# Patient Record
Sex: MALE | Race: WHITE | NOT HISPANIC OR LATINO | URBAN - METROPOLITAN AREA
[De-identification: names, ages, dates, MRNs, and addresses within clinical notes are randomized per-mention and may not be internally consistent; named-entity substitution may affect disease eponyms.]

---

## 2021-07-24 ENCOUNTER — INPATIENT (INPATIENT)
Facility: HOSPITAL | Age: 46
LOS: 1 days | Discharge: HOME | DRG: 177 | End: 2021-07-26
Attending: STUDENT IN AN ORGANIZED HEALTH CARE EDUCATION/TRAINING PROGRAM | Admitting: HOSPITALIST
Payer: COMMERCIAL

## 2021-07-24 VITALS
TEMPERATURE: 99 F | HEIGHT: 70 IN | HEART RATE: 98 BPM | RESPIRATION RATE: 18 BRPM | DIASTOLIC BLOOD PRESSURE: 91 MMHG | SYSTOLIC BLOOD PRESSURE: 137 MMHG | WEIGHT: 293.21 LBS | OXYGEN SATURATION: 92 %

## 2021-07-24 DIAGNOSIS — B34.2 CORONAVIRUS INFECTION, UNSPECIFIED: ICD-10-CM

## 2021-07-24 DIAGNOSIS — Z98.890 OTHER SPECIFIED POSTPROCEDURAL STATES: Chronic | ICD-10-CM

## 2021-07-24 LAB
ALBUMIN SERPL ELPH-MCNC: 3.9 G/DL — SIGNIFICANT CHANGE UP (ref 3.3–5.2)
ALP SERPL-CCNC: 91 U/L — SIGNIFICANT CHANGE UP (ref 40–120)
ALT FLD-CCNC: 49 U/L — HIGH
ANION GAP SERPL CALC-SCNC: 16 MMOL/L — SIGNIFICANT CHANGE UP (ref 5–17)
ANISOCYTOSIS BLD QL: SLIGHT — SIGNIFICANT CHANGE UP
AST SERPL-CCNC: 63 U/L — HIGH
B PERT DNA SPEC QL NAA+PROBE: SIGNIFICANT CHANGE UP
BASOPHILS # BLD AUTO: 0 K/UL — SIGNIFICANT CHANGE UP (ref 0–0.2)
BASOPHILS # BLD AUTO: 0.01 K/UL — SIGNIFICANT CHANGE UP (ref 0–0.2)
BASOPHILS NFR BLD AUTO: 0 % — SIGNIFICANT CHANGE UP (ref 0–2)
BASOPHILS NFR BLD AUTO: 0.2 % — SIGNIFICANT CHANGE UP (ref 0–2)
BILIRUB DIRECT SERPL-MCNC: 0.2 MG/DL — SIGNIFICANT CHANGE UP (ref 0–0.3)
BILIRUB INDIRECT FLD-MCNC: 0.2 MG/DL — SIGNIFICANT CHANGE UP (ref 0.2–1)
BILIRUB SERPL-MCNC: 0.4 MG/DL — SIGNIFICANT CHANGE UP (ref 0.4–2)
BUN SERPL-MCNC: 14.7 MG/DL — SIGNIFICANT CHANGE UP (ref 8–20)
C PNEUM DNA SPEC QL NAA+PROBE: SIGNIFICANT CHANGE UP
CALCIUM SERPL-MCNC: 8.5 MG/DL — LOW (ref 8.6–10.2)
CHLORIDE SERPL-SCNC: 98 MMOL/L — SIGNIFICANT CHANGE UP (ref 98–107)
CO2 SERPL-SCNC: 18 MMOL/L — LOW (ref 22–29)
CREAT SERPL-MCNC: 0.93 MG/DL — SIGNIFICANT CHANGE UP (ref 0.5–1.3)
CREAT SERPL-MCNC: 0.96 MG/DL — SIGNIFICANT CHANGE UP (ref 0.5–1.3)
CRP SERPL-MCNC: 27 MG/L — HIGH
D DIMER BLD IA.RAPID-MCNC: <150 NG/ML DDU — SIGNIFICANT CHANGE UP
DACRYOCYTES BLD QL SMEAR: SLIGHT — SIGNIFICANT CHANGE UP
ELLIPTOCYTES BLD QL SMEAR: SLIGHT — SIGNIFICANT CHANGE UP
EOSINOPHIL # BLD AUTO: 0 K/UL — SIGNIFICANT CHANGE UP (ref 0–0.5)
EOSINOPHIL # BLD AUTO: 0 K/UL — SIGNIFICANT CHANGE UP (ref 0–0.5)
EOSINOPHIL NFR BLD AUTO: 0 % — SIGNIFICANT CHANGE UP (ref 0–6)
EOSINOPHIL NFR BLD AUTO: 0 % — SIGNIFICANT CHANGE UP (ref 0–6)
FERRITIN SERPL-MCNC: 761 NG/ML — HIGH (ref 30–400)
FLUAV H1 2009 PAND RNA SPEC QL NAA+PROBE: SIGNIFICANT CHANGE UP
FLUAV H1 RNA SPEC QL NAA+PROBE: SIGNIFICANT CHANGE UP
FLUAV H3 RNA SPEC QL NAA+PROBE: SIGNIFICANT CHANGE UP
FLUAV SUBTYP SPEC NAA+PROBE: SIGNIFICANT CHANGE UP
FLUBV RNA SPEC QL NAA+PROBE: SIGNIFICANT CHANGE UP
GLUCOSE SERPL-MCNC: 94 MG/DL — SIGNIFICANT CHANGE UP (ref 70–99)
HADV DNA SPEC QL NAA+PROBE: SIGNIFICANT CHANGE UP
HCOV PNL SPEC NAA+PROBE: SIGNIFICANT CHANGE UP
HCT VFR BLD CALC: 43 % — SIGNIFICANT CHANGE UP (ref 39–50)
HCT VFR BLD CALC: 43 % — SIGNIFICANT CHANGE UP (ref 39–50)
HGB BLD-MCNC: 14.3 G/DL — SIGNIFICANT CHANGE UP (ref 13–17)
HGB BLD-MCNC: 14.3 G/DL — SIGNIFICANT CHANGE UP (ref 13–17)
HMPV RNA SPEC QL NAA+PROBE: SIGNIFICANT CHANGE UP
HPIV1 RNA SPEC QL NAA+PROBE: SIGNIFICANT CHANGE UP
HPIV2 RNA SPEC QL NAA+PROBE: SIGNIFICANT CHANGE UP
HPIV3 RNA SPEC QL NAA+PROBE: SIGNIFICANT CHANGE UP
HPIV4 RNA SPEC QL NAA+PROBE: SIGNIFICANT CHANGE UP
IMM GRANULOCYTES NFR BLD AUTO: 0.2 % — SIGNIFICANT CHANGE UP (ref 0–1.5)
INR BLD: 1.2 RATIO — HIGH (ref 0.88–1.16)
LDH SERPL L TO P-CCNC: 412 U/L — HIGH (ref 98–192)
LYMPHOCYTES # BLD AUTO: 0.85 K/UL — LOW (ref 1–3.3)
LYMPHOCYTES # BLD AUTO: 1.31 K/UL — SIGNIFICANT CHANGE UP (ref 1–3.3)
LYMPHOCYTES # BLD AUTO: 21 % — SIGNIFICANT CHANGE UP (ref 13–44)
LYMPHOCYTES # BLD AUTO: 24.6 % — SIGNIFICANT CHANGE UP (ref 13–44)
MACROCYTES BLD QL: SLIGHT — SIGNIFICANT CHANGE UP
MANUAL SMEAR VERIFICATION: SIGNIFICANT CHANGE UP
MCHC RBC-ENTMCNC: 27.3 PG — SIGNIFICANT CHANGE UP (ref 27–34)
MCHC RBC-ENTMCNC: 27.4 PG — SIGNIFICANT CHANGE UP (ref 27–34)
MCHC RBC-ENTMCNC: 33.3 GM/DL — SIGNIFICANT CHANGE UP (ref 32–36)
MCHC RBC-ENTMCNC: 33.3 GM/DL — SIGNIFICANT CHANGE UP (ref 32–36)
MCV RBC AUTO: 82.1 FL — SIGNIFICANT CHANGE UP (ref 80–100)
MCV RBC AUTO: 82.4 FL — SIGNIFICANT CHANGE UP (ref 80–100)
MONOCYTES # BLD AUTO: 0.26 K/UL — SIGNIFICANT CHANGE UP (ref 0–0.9)
MONOCYTES # BLD AUTO: 0.79 K/UL — SIGNIFICANT CHANGE UP (ref 0–0.9)
MONOCYTES NFR BLD AUTO: 14.9 % — HIGH (ref 2–14)
MONOCYTES NFR BLD AUTO: 6.4 % — SIGNIFICANT CHANGE UP (ref 2–14)
NEUTROPHILS # BLD AUTO: 2.79 K/UL — SIGNIFICANT CHANGE UP (ref 1.8–7.4)
NEUTROPHILS # BLD AUTO: 2.91 K/UL — SIGNIFICANT CHANGE UP (ref 1.8–7.4)
NEUTROPHILS NFR BLD AUTO: 52.6 % — SIGNIFICANT CHANGE UP (ref 43–77)
NEUTROPHILS NFR BLD AUTO: 72.2 % — SIGNIFICANT CHANGE UP (ref 43–77)
PLAT MORPH BLD: NORMAL — SIGNIFICANT CHANGE UP
PLATELET # BLD AUTO: 163 K/UL — SIGNIFICANT CHANGE UP (ref 150–400)
PLATELET # BLD AUTO: 174 K/UL — SIGNIFICANT CHANGE UP (ref 150–400)
POIKILOCYTOSIS BLD QL AUTO: SLIGHT — SIGNIFICANT CHANGE UP
POLYCHROMASIA BLD QL SMEAR: SLIGHT — SIGNIFICANT CHANGE UP
POTASSIUM SERPL-MCNC: 4.1 MMOL/L — SIGNIFICANT CHANGE UP (ref 3.5–5.3)
POTASSIUM SERPL-SCNC: 4.1 MMOL/L — SIGNIFICANT CHANGE UP (ref 3.5–5.3)
PROCALCITONIN SERPL-MCNC: 0.13 NG/ML — HIGH (ref 0.02–0.1)
PROT SERPL-MCNC: 7.6 G/DL — SIGNIFICANT CHANGE UP (ref 6.6–8.7)
PROTHROM AB SERPL-ACNC: 13.8 SEC — HIGH (ref 10.6–13.6)
RAPID RVP RESULT: DETECTED
RBC # BLD: 5.22 M/UL — SIGNIFICANT CHANGE UP (ref 4.2–5.8)
RBC # BLD: 5.24 M/UL — SIGNIFICANT CHANGE UP (ref 4.2–5.8)
RBC # FLD: 13.6 % — SIGNIFICANT CHANGE UP (ref 10.3–14.5)
RBC # FLD: 13.8 % — SIGNIFICANT CHANGE UP (ref 10.3–14.5)
RBC BLD AUTO: ABNORMAL
RV+EV RNA SPEC QL NAA+PROBE: SIGNIFICANT CHANGE UP
SARS-COV-2 RNA SPEC QL NAA+PROBE: DETECTED
SODIUM SERPL-SCNC: 132 MMOL/L — LOW (ref 135–145)
TROPONIN T SERPL-MCNC: <0.01 NG/ML — SIGNIFICANT CHANGE UP (ref 0–0.06)
VARIANT LYMPHS # BLD: 7.9 % — HIGH (ref 0–6)
WBC # BLD: 4.04 K/UL — SIGNIFICANT CHANGE UP (ref 3.8–10.5)
WBC # BLD: 5.31 K/UL — SIGNIFICANT CHANGE UP (ref 3.8–10.5)
WBC # FLD AUTO: 4.04 K/UL — SIGNIFICANT CHANGE UP (ref 3.8–10.5)
WBC # FLD AUTO: 5.31 K/UL — SIGNIFICANT CHANGE UP (ref 3.8–10.5)

## 2021-07-24 PROCEDURE — 93010 ELECTROCARDIOGRAM REPORT: CPT

## 2021-07-24 PROCEDURE — 12345: CPT | Mod: NC

## 2021-07-24 PROCEDURE — 99223 1ST HOSP IP/OBS HIGH 75: CPT

## 2021-07-24 PROCEDURE — 99285 EMERGENCY DEPT VISIT HI MDM: CPT

## 2021-07-24 PROCEDURE — 71045 X-RAY EXAM CHEST 1 VIEW: CPT | Mod: 26

## 2021-07-24 PROCEDURE — 99222 1ST HOSP IP/OBS MODERATE 55: CPT

## 2021-07-24 RX ORDER — PANTOPRAZOLE SODIUM 20 MG/1
40 TABLET, DELAYED RELEASE ORAL
Refills: 0 | Status: DISCONTINUED | OUTPATIENT
Start: 2021-07-24 | End: 2021-07-26

## 2021-07-24 RX ORDER — DEXAMETHASONE 0.5 MG/5ML
10 ELIXIR ORAL ONCE
Refills: 0 | Status: COMPLETED | OUTPATIENT
Start: 2021-07-24 | End: 2021-07-24

## 2021-07-24 RX ORDER — ATORVASTATIN CALCIUM 80 MG/1
0 TABLET, FILM COATED ORAL
Qty: 0 | Refills: 0 | DISCHARGE

## 2021-07-24 RX ORDER — GUAIFENESIN/DEXTROMETHORPHAN 600MG-30MG
10 TABLET, EXTENDED RELEASE 12 HR ORAL EVERY 4 HOURS
Refills: 0 | Status: DISCONTINUED | OUTPATIENT
Start: 2021-07-24 | End: 2021-07-26

## 2021-07-24 RX ORDER — DEXAMETHASONE 0.5 MG/5ML
6 ELIXIR ORAL DAILY
Refills: 0 | Status: DISCONTINUED | OUTPATIENT
Start: 2021-07-24 | End: 2021-07-26

## 2021-07-24 RX ORDER — ZINC SULFATE TAB 220 MG (50 MG ZINC EQUIVALENT) 220 (50 ZN) MG
220 TAB ORAL DAILY
Refills: 0 | Status: DISCONTINUED | OUTPATIENT
Start: 2021-07-24 | End: 2021-07-26

## 2021-07-24 RX ORDER — ALBUTEROL 90 UG/1
2 AEROSOL, METERED ORAL EVERY 4 HOURS
Refills: 0 | Status: DISCONTINUED | OUTPATIENT
Start: 2021-07-24 | End: 2021-07-26

## 2021-07-24 RX ORDER — ENOXAPARIN SODIUM 100 MG/ML
40 INJECTION SUBCUTANEOUS EVERY 12 HOURS
Refills: 0 | Status: DISCONTINUED | OUTPATIENT
Start: 2021-07-24 | End: 2021-07-26

## 2021-07-24 RX ORDER — ACETAMINOPHEN 500 MG
650 TABLET ORAL EVERY 4 HOURS
Refills: 0 | Status: DISCONTINUED | OUTPATIENT
Start: 2021-07-24 | End: 2021-07-26

## 2021-07-24 RX ORDER — REMDESIVIR 5 MG/ML
INJECTION INTRAVENOUS
Refills: 0 | Status: DISCONTINUED | OUTPATIENT
Start: 2021-07-24 | End: 2021-07-26

## 2021-07-24 RX ORDER — ATORVASTATIN CALCIUM 80 MG/1
20 TABLET, FILM COATED ORAL AT BEDTIME
Refills: 0 | Status: DISCONTINUED | OUTPATIENT
Start: 2021-07-24 | End: 2021-07-26

## 2021-07-24 RX ORDER — VENLAFAXINE HCL 75 MG
0 CAPSULE, EXT RELEASE 24 HR ORAL
Qty: 0 | Refills: 1 | DISCHARGE

## 2021-07-24 RX ORDER — REMDESIVIR 5 MG/ML
100 INJECTION INTRAVENOUS EVERY 24 HOURS
Refills: 0 | Status: DISCONTINUED | OUTPATIENT
Start: 2021-07-25 | End: 2021-07-26

## 2021-07-24 RX ORDER — REMDESIVIR 5 MG/ML
200 INJECTION INTRAVENOUS EVERY 24 HOURS
Refills: 0 | Status: COMPLETED | OUTPATIENT
Start: 2021-07-24 | End: 2021-07-24

## 2021-07-24 RX ORDER — ASCORBIC ACID 60 MG
500 TABLET,CHEWABLE ORAL DAILY
Refills: 0 | Status: DISCONTINUED | OUTPATIENT
Start: 2021-07-24 | End: 2021-07-26

## 2021-07-24 RX ORDER — IPRATROPIUM/ALBUTEROL SULFATE 18-103MCG
3 AEROSOL WITH ADAPTER (GRAM) INHALATION ONCE
Refills: 0 | Status: COMPLETED | OUTPATIENT
Start: 2021-07-24 | End: 2021-07-24

## 2021-07-24 RX ORDER — LANOLIN ALCOHOL/MO/W.PET/CERES
3 CREAM (GRAM) TOPICAL AT BEDTIME
Refills: 0 | Status: DISCONTINUED | OUTPATIENT
Start: 2021-07-24 | End: 2021-07-26

## 2021-07-24 RX ORDER — VENLAFAXINE HCL 75 MG
37.5 CAPSULE, EXT RELEASE 24 HR ORAL DAILY
Refills: 0 | Status: DISCONTINUED | OUTPATIENT
Start: 2021-07-24 | End: 2021-07-26

## 2021-07-24 RX ORDER — CHOLECALCIFEROL (VITAMIN D3) 125 MCG
2000 CAPSULE ORAL DAILY
Refills: 0 | Status: DISCONTINUED | OUTPATIENT
Start: 2021-07-24 | End: 2021-07-26

## 2021-07-24 RX ADMIN — Medication 10 MILLILITER(S): at 21:24

## 2021-07-24 RX ADMIN — ENOXAPARIN SODIUM 40 MILLIGRAM(S): 100 INJECTION SUBCUTANEOUS at 05:06

## 2021-07-24 RX ADMIN — Medication 500 MILLIGRAM(S): at 10:54

## 2021-07-24 RX ADMIN — PANTOPRAZOLE SODIUM 40 MILLIGRAM(S): 20 TABLET, DELAYED RELEASE ORAL at 05:06

## 2021-07-24 RX ADMIN — ZINC SULFATE TAB 220 MG (50 MG ZINC EQUIVALENT) 220 MILLIGRAM(S): 220 (50 ZN) TAB at 10:54

## 2021-07-24 RX ADMIN — ENOXAPARIN SODIUM 40 MILLIGRAM(S): 100 INJECTION SUBCUTANEOUS at 19:10

## 2021-07-24 RX ADMIN — Medication 3 MILLIGRAM(S): at 23:26

## 2021-07-24 RX ADMIN — Medication 2000 UNIT(S): at 10:55

## 2021-07-24 RX ADMIN — Medication 10 MILLIGRAM(S): at 02:09

## 2021-07-24 RX ADMIN — Medication 10 MILLILITER(S): at 05:03

## 2021-07-24 RX ADMIN — Medication 100 MILLIGRAM(S): at 21:24

## 2021-07-24 RX ADMIN — REMDESIVIR 500 MILLIGRAM(S): 5 INJECTION INTRAVENOUS at 10:54

## 2021-07-24 RX ADMIN — Medication 6 MILLIGRAM(S): at 05:06

## 2021-07-24 RX ADMIN — ATORVASTATIN CALCIUM 20 MILLIGRAM(S): 80 TABLET, FILM COATED ORAL at 21:24

## 2021-07-24 RX ADMIN — Medication 3 MILLILITER(S): at 02:10

## 2021-07-24 NOTE — H&P ADULT - ASSESSMENT
47 yo male, pmh of anxiety/depression, hld who presents today to ED for worsening covid symptoms, in setting known covid positivity, now with hypoxia for which patient to be admitted.     Admit to Medicine, Dr. Polanco   Bed  vitals per routine  ambulate as tolerated  dash/tlc diet    #Acute Hypoxic Respiratory Failure 2/2 Covid19 infection  known positive from community  pending covid19 pcr in hospital  nasal cannula supplemental o2  started on dexamethasone today  started on remdisivir  albuterol prn  tessalon and Robitussin prn   vitamin c  vitamin d  zinc   prone  tylenol prn for fever  will maintain low threshold for care escalation based on oxygen saturation    #HLD  c/w statin    #Anxiety/Depression  C/w SNRI     vte ppx: lovenox bid   GI: on protronix while on dexamethasone      47 yo male, pmh of anxiety/depression, hld who presents today to ED for worsening covid symptoms, in setting known covid positivity, now with hypoxia for which patient to be admitted.     Admit to Medicine, Dr. Polanco   Bed  vitals per routine  ambulate as tolerated  dash/tlc diet    #Acute Hypoxic Respiratory Failure 2/2 Covid19 infection  known positive from community  pending covid19 pcr in hospital  nasal cannula supplemental o2  started on dexamethasone today  started on remdisivir  ID consult in AM  albuterol prn  tessalon and Robitussin prn   vitamin c  vitamin d  zinc   prone  tylenol prn for fever  will maintain low threshold for care escalation based on oxygen saturation    #HLD  c/w statin    #Anxiety/Depression  C/w SNRI     vte ppx: lovenox bid   GI: on protronix while on dexamethasone

## 2021-07-24 NOTE — CONSULT NOTE ADULT - SUBJECTIVE AND OBJECTIVE BOX
[facility]  INFECTIOUS DISEASES AND INTERNAL MEDICINE at [place]  =======================================================  Maximino Parham MD    Diplomates American Board of Internal Medicine and Infectious Diseases  Tel  [phone]  Fax [phone]  =======================================================    [id number]  [facility] GRULLON   HPI:  47 yo male, pmh of anxiety/depression, hld who presents today to ED for worsening cough, head congestion and dyspnea, worse on exertion. Says that he tested positive for covid 2 days prior on wednesday. Says that his entire family (Wife and son) also positive for covid at home. Symptoms started several days prior on saturday, described as flu like symptoms with body aches, chills, fever, cough, dyspnea, fatigue. Since then, has noted increased difficulty breathing and worsening cough, unable to produce sputum. Says that he did NOT obtain covid vaccine despite having qualified earlier. Also associated with diarrhea but not abdominal pain, n/v. No other numbness/weakness in body. Denies headache, blurry vision, chest pain, palpitations, dysuria, hematuria, leg swelling.     In ED: given decadron 10, duonebs x1, admission called for hypoxia.  (24 Jul 2021 03:28)    patient still has SOB  COVID testing is confirmed positive here.   Xray of the lungs show: bilateral infiltrates, PRELIM.     He is unvaccinated.   13 yr old SON and his WIFE are both sick    he is overweight.   height 5'10"  weight 295lb      I have personally reviewed the labs and data; pertinent labs and data are listed in this note; please see below.   =======================================================  Past Medical & Surgical Hx:  =====================  PAST MEDICAL & SURGICAL HISTORY:  Anxiety and depression    HLD (hyperlipidemia)    H/O lithotripsy      Problem List:  ==========  HEALTH ISSUES - PROBLEM Dx:        Social Hx:  =======  no toxic habits currently    FAMILY HISTORY:  FH: diabetes mellitus (Father)    FH: HTN (hypertension) (Father)    Family history of high cholesterol (Father)    no significant family history of immunosuppressive disorders in mother or father   =======================================================    REVIEW OF SYSTEMS:  CONSTITUTIONAL:  as per HPI  HEENT:  No diplopia or blurred vision.  No earache, sore throat or runny nose.  CARDIOVASCULAR:  No pressure, squeezing, strangling, tightness, heaviness or aching about the chest, neck, axilla or epigastrium.  RESPIRATORY:  as per HPI  GASTROINTESTINAL:  No nausea, vomiting or diarrhea.  GENITOURINARY:  No dysuria, frequency or urgency. No Blood in urine  MUSCULOSKELETAL:  no joint aches, no muscle pain  SKIN:  No change in skin, hair or nails.  NEUROLOGIC:  No Headaches, seizures or weakness.  PSYCHIATRIC:  No disorder of thought or mood.  ENDOCRINE:  No heat or cold intolerance  HEMATOLOGICAL:  No easy bruising or bleeding.    =======================================================  Allergies    No Known Allergies    Intolerances    Antibiotics:  remdesivir  IVPB 200 milliGRAM(s) IV Intermittent every 24 hours  remdesivir  IVPB   IV Intermittent     Other medications:  ascorbic acid 500 milliGRAM(s) Oral daily  atorvastatin 20 milliGRAM(s) Oral at bedtime  cholecalciferol 2000 Unit(s) Oral daily  dexAMETHasone     Tablet 6 milliGRAM(s) Oral daily  enoxaparin Injectable 40 milliGRAM(s) SubCutaneous every 12 hours  pantoprazole    Tablet 40 milliGRAM(s) Oral before breakfast  venlafaxine XR. 37.5 milliGRAM(s) Oral daily  zinc sulfate 220 milliGRAM(s) Oral daily       ======================================================  Physical Exam:  ============  T(F): 98.9 (24 Jul 2021 04:33), Max: 99.1 (24 Jul 2021 00:47)  HR: 96 (24 Jul 2021 04:33)  BP: 119/74 (24 Jul 2021 04:33)  RR: 18 (24 Jul 2021 04:33)  SpO2: 94% (24 Jul 2021 04:33) (92% - 94%)  temp max in last 48H T(F): , Max: 99.1 (07-24-21 @ 00:47)Height (cm): 177.8 (07-24-21 @ 00:47)  Weight (kg): 133 (07-24-21 @ 00:47)  BMI (kg/m2): 42.1 (07-24-21 @ 00:47)  BSA (m2): 2.46 (07-24-21 @ 00:47)    General:  No acute distress.  OBESE  Eye: Pupils are equal, round and reactive to light, Extraocular movements are intact, Normal conjunctiva.  HENT: Normocephalic, Oral mucosa is moist, No pharyngeal erythema, No sinus tenderness.  ON SUPPLEMENTAL OXYGEN  Neck: Supple, No lymphadenopathy.  Respiratory: Lungs  with COARSE breath sounds.  Cardiovascular: Normal rate, Regular rhythm,   Gastrointestinal: Soft, Non-tender, Non-distended, Normal bowel sounds.  VENTRAL HERNIA  Genitourinary: No costovertebral angle tenderness.  Lymphatics: No lymphadenopathy neck,   Musculoskeletal: Normal range of motion, Normal strength.  Integumentary: No rash.  Neurologic: Alert, Oriented, No focal deficits, Cranial Nerves II-XII are grossly intact.  Psychiatric: Appropriate mood & affect.    =======================================================  Labs:                        14.3   4.04  )-----------( 174      ( 24 Jul 2021 05:48 )             43.0     07-24    x   |  x   |  x   ----------------------------<  x   x    |  x   |  0.93    Ca    8.5<L>      24 Jul 2021 02:14    TPro  7.6  /  Alb  3.9  /  TBili  0.4  /  DBili  0.2  /  AST  63<H>  /  ALT  49<H>  /  AlkPhos  91  07-24      Creatinine, Serum: 0.93 mg/dL (07-24-21 @ 05:47)  Creatinine, Serum: 0.96 mg/dL (07-24-21 @ 02:14)    C-Reactive Protein, Serum: 27 mg/L (07-24-21 @ 05:48)      Procalcitonin, Serum: 0.13 ng/mL (07-24-21 @ 05:48)    SARS-CoV-2: Detected (07-24-21 @ 02:13)  Rapid RVP Result: Detected (07-24-21 @ 02:13)

## 2021-07-24 NOTE — PROGRESS NOTE PEDS - ASSESSMENT
46M with history of anxiety and depression  and HLD presenting with symptoms of general malaise, insomnia, cough, SOB from home after testing COVID + 7/21 (Family is + but asymptomatic) dound to have Hypoxic Resp failure on arrival with moderate increase in inflamm markers c/w COVID PNA    #covid pna ccb hypoxic resp failure   S/p Dex in ED with improvement in oxygen requirement. On RA during exam this morning however with significant coughing and dyspnea on exertion.   ID following   Plan to monitor closely while continuing steroids/remdesivir   Will trend inflamm markers and LFTs  Incentive spirometry, Encourage Self Prone      DVT PPX Lovenox BID  Dispo Patients status remains acute and requires close monitoring and continuous pulse ox as well as IV antivirals. If status stable can consider discharge Sunday /Monday

## 2021-07-24 NOTE — ED PROVIDER NOTE - OBJECTIVE STATEMENT
47 y/o male denies PMHx c/o SOB. Pt states he was diagnoses with Covid on Wednesday, has had symptoms since last Saturday. Pt endorses worsening SOB, cough, endorses headache, chills. Pt endorses colorless phlegm. Pt endorses decreased PO intake. Pt states he is not vaccinated against Covid.    PMD: Dr. Leach    denies neck pain. no chest pain no abd pain. no n/v/d. no urinary f/u/d. no back pain. no motor or sensory deficits. denies illicit drug use. no recent travel. no rash. no other acute issues symptoms or concerns 45 y/o male denies PMHx c/o SOB. Pt states he was diagnoses with Covid on Wednesday, has had symptoms since last Saturday. Pt endorses worsening SOB, cough, endorses headache, chills. Pt endorses colorless phlegm. Pt endorses decreased PO intake. Pt denies hemoptysis. Pt states he is not vaccinated against Covid.    PMD: Dr. Leach    denies neck pain. no chest pain no abd pain. no n/v/d. no urinary f/u/d. no back pain. no motor or sensory deficits. denies illicit drug use. no recent travel. no rash. no other acute issues symptoms or concerns

## 2021-07-24 NOTE — ED ADULT TRIAGE NOTE - CHIEF COMPLAINT QUOTE
patient states that he is +COVID, states that he is feeling SOB cough head congestion unable to drink and starts coughing, feeling fatigue, DX Tuesday

## 2021-07-24 NOTE — ED ADULT NURSE NOTE - OBJECTIVE STATEMENT
Patient A&Ox4 complaining of SOB.  Pt dx with covid Wednesday, sx started Saturday.  C/o flu like sx, cough, chills, general malaise. Family also covid positive.  O2 sat 92-94% on RA. NAD noted, respirations even and unlabored.  Safety precautions in place.  Plan of care explained, pt verbalized understanding.

## 2021-07-24 NOTE — CONSULT NOTE ADULT - ASSESSMENT
45 yo male, pmh of anxiety/depression, hld who presents 7/23/21 to ED for worsening cough, head congestion and dyspnea, worse on exertion. Says that he tested positive for covid 2 days prior on wednesday. Says that his entire family (Wife and son) also positive for covid at home. Symptoms started several days prior on saturday, described as flu like symptoms with body aches, chills, fever, cough, dyspnea, fatigue. Since then, has noted increased difficulty breathing and worsening cough, unable to produce sputum. Says that he did NOT obtain covid vaccine despite having qualified earlier. Also associated with diarrhea but not abdominal pain, n/v. No other numbness/weakness in body. Denies headache, blurry vision, chest pain, palpitations, dysuria, hematuria, leg swelling.   In ED: given decadron 10, duonebs x1, admission called for hypoxia.  (24 Jul 2021 03:28)    COVID testing is confirmed positive here.  Xray of the lungs show: bilateral infiltrates, PRELIM.   He is unvaccinated.   13 yr old SON and his WIFE are both sick  he is overweight.   height 5'10"  weight 295lb      Impression:  COVID-19 infection   Viral pneumonia  shortness of breath  lung infiltrates  dependence on oxygen    Plan:  - continue Remdesivir IV daily.  will plan for a 5 day course.   - MAY stop earlier than 5 days, and send home, if patient is back on Ambient oxygen/ room air.  - alternatively MAY need a 10 day course if little improvement during the 5 day course.      - continue dexamethasone 6mg daily, While on remdesivir  trend Inflammatory markers and LFTs   - trend CBC with diff, CMP  daily    - Continue supportive care measures  - continue Oxygenation as needed;  CONTINUE to titrate down as tolerated  - self- proning  as tolerated  - ENCOURAGED OOB to chair  - encouraged incentive spirometry       discussed possible Actemra/Tocilizumab w/ patient if he worsens,  he will consider it.      Will follow with you.

## 2021-07-24 NOTE — H&P ADULT - HISTORY OF PRESENT ILLNESS
45 yo male, pmh of anxiety/depression, hld who presents today to ED for worsening cough, head congestion and dyspnea, worse on exertion. Says that he tested positive for covid 2 days prior on wednesday. Says that his entire family (Wife and son) also positive for covid at home. Symptoms started several days prior on saturday, described as flu like symptoms with body aches, chills, fever, cough, dyspnea, fatigue. Since then, has noted increased difficulty breathing and worsening cough, unable to produce sputum. Says that he did NOT obtain covid vaccine despite having qualified earlier. Also associated with diarrhea but not abdominal pain, n/v. No other numbness/weakness in body. Denies headache blurry vision, chest pain, palpitations, abdominal pain, c/d.     In ED: given decadron 10, duonebs x1, admission called.  45 yo male, pmh of anxiety/depression, hld who presents today to ED for worsening cough, head congestion and dyspnea, worse on exertion. Says that he tested positive for covid 2 days prior on wednesday. Says that his entire family (Wife and son) also positive for covid at home. Symptoms started several days prior on saturday, described as flu like symptoms with body aches, chills, fever, cough, dyspnea, fatigue. Since then, has noted increased difficulty breathing and worsening cough, unable to produce sputum. Says that he did NOT obtain covid vaccine despite having qualified earlier. Also associated with diarrhea but not abdominal pain, n/v. No other numbness/weakness in body. Denies headache blurry vision, chest pain, palpitations, abdominal pain, c/d.     In ED: given decadron 10, duonebs x1, admission called for hypoxia.  45 yo male, pmh of anxiety/depression, hld who presents today to ED for worsening cough, head congestion and dyspnea, worse on exertion. Says that he tested positive for covid 2 days prior on wednesday. Says that his entire family (Wife and son) also positive for covid at home. Symptoms started several days prior on saturday, described as flu like symptoms with body aches, chills, fever, cough, dyspnea, fatigue. Since then, has noted increased difficulty breathing and worsening cough, unable to produce sputum. Says that he did NOT obtain covid vaccine despite having qualified earlier. Also associated with diarrhea but not abdominal pain, n/v. No other numbness/weakness in body. Denies headache, blurry vision, chest pain, palpitations, dysuria, hematuria, leg swelling.     In ED: given decadron 10, dayana x1, admission called for hypoxia.

## 2021-07-24 NOTE — H&P ADULT - NSICDXFAMILYHX_GEN_ALL_CORE_FT
FAMILY HISTORY:  Father  Still living? Unknown  Family history of high cholesterol, Age at diagnosis: Age Unknown  FH: diabetes mellitus, Age at diagnosis: Age Unknown  FH: HTN (hypertension), Age at diagnosis: Age Unknown

## 2021-07-24 NOTE — H&P ADULT - NSHPPHYSICALEXAM_GEN_ALL_CORE
Vital Signs Last 24 Hrs  T(F): 99.1 (24 Jul 2021 00:47), Max: 99.1 (24 Jul 2021 00:47)  HR: 98 (24 Jul 2021 00:47) (98 - 98)  BP: 137/91 (24 Jul 2021 00:47) (137/91 - 137/91)  RR: 18 (24 Jul 2021 00:47) (18 - 18)  SpO2: 92% (24 Jul 2021 00:47) (92% - 92%)    Physical Exam:  Constitutional: NAD, awake and alert, ill-developed  Eyes: EOMI, PERRL  Mouth: moist oral mucosa   Neck: Soft and supple, No LAD   Respiratory: coarse crackles in b/l bases, no accessory muscle use.    Cardiovascular: +s1/s2 RRR, no edema b/l le  Gastrointestinal: soft nt nd bs+  Vascular: 2+ peripheral pulses, no calf tenderness   Neurological: A/O x 3, no focal deficits  Musculoskeletal: 5/5 strength b/l upper and lower extremities  Skin: exposed skin warm, dry, well perfused   Psych: has insight into condition, appropriate mood   : Contraindicated  Breasts: Contraindicated  Rectal: Contraindicated

## 2021-07-25 LAB
A1C WITH ESTIMATED AVERAGE GLUCOSE RESULT: 5.7 % — HIGH (ref 4–5.6)
ALBUMIN SERPL ELPH-MCNC: 3.7 G/DL — SIGNIFICANT CHANGE UP (ref 3.3–5.2)
ALBUMIN SERPL ELPH-MCNC: 3.7 G/DL — SIGNIFICANT CHANGE UP (ref 3.3–5.2)
ALP SERPL-CCNC: 82 U/L — SIGNIFICANT CHANGE UP (ref 40–120)
ALP SERPL-CCNC: 85 U/L — SIGNIFICANT CHANGE UP (ref 40–120)
ALT FLD-CCNC: 54 U/L — HIGH
ALT FLD-CCNC: 56 U/L — HIGH
ANION GAP SERPL CALC-SCNC: 14 MMOL/L — SIGNIFICANT CHANGE UP (ref 5–17)
AST SERPL-CCNC: 54 U/L — HIGH
AST SERPL-CCNC: 55 U/L — HIGH
BASOPHILS # BLD AUTO: 0.01 K/UL — SIGNIFICANT CHANGE UP (ref 0–0.2)
BASOPHILS NFR BLD AUTO: 0.1 % — SIGNIFICANT CHANGE UP (ref 0–2)
BILIRUB DIRECT SERPL-MCNC: 0.1 MG/DL — SIGNIFICANT CHANGE UP (ref 0–0.3)
BILIRUB INDIRECT FLD-MCNC: 0.2 MG/DL — SIGNIFICANT CHANGE UP (ref 0.2–1)
BILIRUB SERPL-MCNC: 0.4 MG/DL — SIGNIFICANT CHANGE UP (ref 0.4–2)
BILIRUB SERPL-MCNC: 0.4 MG/DL — SIGNIFICANT CHANGE UP (ref 0.4–2)
BUN SERPL-MCNC: 26.4 MG/DL — HIGH (ref 8–20)
CALCIUM SERPL-MCNC: 9.7 MG/DL — SIGNIFICANT CHANGE UP (ref 8.6–10.2)
CHLORIDE SERPL-SCNC: 102 MMOL/L — SIGNIFICANT CHANGE UP (ref 98–107)
CO2 SERPL-SCNC: 23 MMOL/L — SIGNIFICANT CHANGE UP (ref 22–29)
COVID-19 SPIKE DOMAIN AB INTERP: NEGATIVE — SIGNIFICANT CHANGE UP
COVID-19 SPIKE DOMAIN ANTIBODY RESULT: 0.4 U/ML — SIGNIFICANT CHANGE UP
CREAT SERPL-MCNC: 1.1 MG/DL — SIGNIFICANT CHANGE UP (ref 0.5–1.3)
EOSINOPHIL # BLD AUTO: 0 K/UL — SIGNIFICANT CHANGE UP (ref 0–0.5)
EOSINOPHIL NFR BLD AUTO: 0 % — SIGNIFICANT CHANGE UP (ref 0–6)
ESTIMATED AVERAGE GLUCOSE: 117 MG/DL — HIGH (ref 68–114)
GLUCOSE SERPL-MCNC: 129 MG/DL — HIGH (ref 70–99)
HCT VFR BLD CALC: 44.7 % — SIGNIFICANT CHANGE UP (ref 39–50)
HGB BLD-MCNC: 14.5 G/DL — SIGNIFICANT CHANGE UP (ref 13–17)
IMM GRANULOCYTES NFR BLD AUTO: 0.5 % — SIGNIFICANT CHANGE UP (ref 0–1.5)
INR BLD: 1.09 RATIO — SIGNIFICANT CHANGE UP (ref 0.88–1.16)
LYMPHOCYTES # BLD AUTO: 1.87 K/UL — SIGNIFICANT CHANGE UP (ref 1–3.3)
LYMPHOCYTES # BLD AUTO: 21.3 % — SIGNIFICANT CHANGE UP (ref 13–44)
MAGNESIUM SERPL-MCNC: 2.4 MG/DL — SIGNIFICANT CHANGE UP (ref 1.6–2.6)
MCHC RBC-ENTMCNC: 27.2 PG — SIGNIFICANT CHANGE UP (ref 27–34)
MCHC RBC-ENTMCNC: 32.4 GM/DL — SIGNIFICANT CHANGE UP (ref 32–36)
MCV RBC AUTO: 83.7 FL — SIGNIFICANT CHANGE UP (ref 80–100)
MONOCYTES # BLD AUTO: 1.14 K/UL — HIGH (ref 0–0.9)
MONOCYTES NFR BLD AUTO: 13 % — SIGNIFICANT CHANGE UP (ref 2–14)
NEUTROPHILS # BLD AUTO: 5.74 K/UL — SIGNIFICANT CHANGE UP (ref 1.8–7.4)
NEUTROPHILS NFR BLD AUTO: 65.1 % — SIGNIFICANT CHANGE UP (ref 43–77)
PHOSPHATE SERPL-MCNC: 4.5 MG/DL — SIGNIFICANT CHANGE UP (ref 2.4–4.7)
PLATELET # BLD AUTO: 208 K/UL — SIGNIFICANT CHANGE UP (ref 150–400)
POTASSIUM SERPL-MCNC: 4.8 MMOL/L — SIGNIFICANT CHANGE UP (ref 3.5–5.3)
POTASSIUM SERPL-SCNC: 4.8 MMOL/L — SIGNIFICANT CHANGE UP (ref 3.5–5.3)
PROT SERPL-MCNC: 7.3 G/DL — SIGNIFICANT CHANGE UP (ref 6.6–8.7)
PROT SERPL-MCNC: 7.4 G/DL — SIGNIFICANT CHANGE UP (ref 6.6–8.7)
PROTHROM AB SERPL-ACNC: 12.6 SEC — SIGNIFICANT CHANGE UP (ref 10.6–13.6)
RBC # BLD: 5.34 M/UL — SIGNIFICANT CHANGE UP (ref 4.2–5.8)
RBC # FLD: 14 % — SIGNIFICANT CHANGE UP (ref 10.3–14.5)
SARS-COV-2 IGG+IGM SERPL QL IA: 0.4 U/ML — SIGNIFICANT CHANGE UP
SARS-COV-2 IGG+IGM SERPL QL IA: NEGATIVE — SIGNIFICANT CHANGE UP
SODIUM SERPL-SCNC: 138 MMOL/L — SIGNIFICANT CHANGE UP (ref 135–145)
WBC # BLD: 8.8 K/UL — SIGNIFICANT CHANGE UP (ref 3.8–10.5)
WBC # FLD AUTO: 8.8 K/UL — SIGNIFICANT CHANGE UP (ref 3.8–10.5)

## 2021-07-25 PROCEDURE — 99232 SBSQ HOSP IP/OBS MODERATE 35: CPT

## 2021-07-25 RX ADMIN — ENOXAPARIN SODIUM 40 MILLIGRAM(S): 100 INJECTION SUBCUTANEOUS at 05:11

## 2021-07-25 RX ADMIN — REMDESIVIR 500 MILLIGRAM(S): 5 INJECTION INTRAVENOUS at 10:17

## 2021-07-25 RX ADMIN — ATORVASTATIN CALCIUM 20 MILLIGRAM(S): 80 TABLET, FILM COATED ORAL at 20:58

## 2021-07-25 RX ADMIN — ENOXAPARIN SODIUM 40 MILLIGRAM(S): 100 INJECTION SUBCUTANEOUS at 16:23

## 2021-07-25 RX ADMIN — Medication 10 MILLILITER(S): at 05:11

## 2021-07-25 RX ADMIN — Medication 37.5 MILLIGRAM(S): at 10:17

## 2021-07-25 RX ADMIN — PANTOPRAZOLE SODIUM 40 MILLIGRAM(S): 20 TABLET, DELAYED RELEASE ORAL at 06:03

## 2021-07-25 RX ADMIN — Medication 2000 UNIT(S): at 10:17

## 2021-07-25 RX ADMIN — ZINC SULFATE TAB 220 MG (50 MG ZINC EQUIVALENT) 220 MILLIGRAM(S): 220 (50 ZN) TAB at 10:17

## 2021-07-25 RX ADMIN — Medication 500 MILLIGRAM(S): at 10:17

## 2021-07-25 RX ADMIN — Medication 6 MILLIGRAM(S): at 05:11

## 2021-07-25 NOTE — PROGRESS NOTE ADULT - SUBJECTIVE AND OBJECTIVE BOX
[facility]  INFECTIOUS DISEASES AND INTERNAL MEDICINE at [place]  =======================================================  Maximino Parham MD    Diplomates American Board of Internal Medicine and Infectious Diseases  Tel  [phone]  Fax [phone]  =======================================================    [facility]-[id number]  [facility]ULLON   follow up: COVID-19    reports breathing well  has been off oxygen since yesterday  92% sat on room air    some cough with deep insp.            height 5'10"  weight 295lb      I have personally reviewed the labs and data; pertinent labs and data are listed in this note; please see below.   =======================================================  Past Medical & Surgical Hx:  =====================  PAST MEDICAL & SURGICAL HISTORY:  Anxiety and depression    HLD (hyperlipidemia)    H/O lithotripsy      Problem List:  ==========  HEALTH ISSUES - PROBLEM Dx:        Social Hx:  =======  no toxic habits currently    FAMILY HISTORY:  FH: diabetes mellitus (Father)    FH: HTN (hypertension) (Father)    Family history of high cholesterol (Father)    no significant family history of immunosuppressive disorders in mother or father   =======================================================    REVIEW OF SYSTEMS:  CONSTITUTIONAL:  as per HPI  HEENT:  No diplopia or blurred vision.  No earache, sore throat or runny nose.  CARDIOVASCULAR:  No pressure, squeezing, strangling, tightness, heaviness or aching about the chest, neck, axilla or epigastrium.  RESPIRATORY:  as per HPI  GASTROINTESTINAL:  No nausea, vomiting or diarrhea.  GENITOURINARY:  No dysuria, frequency or urgency. No Blood in urine  MUSCULOSKELETAL:  no joint aches, no muscle pain  SKIN:  No change in skin, hair or nails.  NEUROLOGIC:  No Headaches, seizures or weakness.  PSYCHIATRIC:  No disorder of thought or mood.  ENDOCRINE:  No heat or cold intolerance  HEMATOLOGICAL:  No easy bruising or bleeding.    =======================================================  Allergies  No Known Allergies          ======================================================  Physical Exam:  ============     General:  No acute distress.  OBESE  Eye: Pupils are equal, round and reactive to light, Extraocular movements are intact, Normal conjunctiva.  HENT: Normocephalic, Oral mucosa is moist, No pharyngeal erythema, No sinus tenderness.  ON SUPPLEMENTAL OXYGEN  Neck: Supple, No lymphadenopathy.  Respiratory: Lungs  with COARSE breath sounds.  Cardiovascular: Normal rate, Regular rhythm,   Gastrointestinal: Soft, Non-tender, Non-distended, Normal bowel sounds.  Genitourinary: No costovertebral angle tenderness.  Lymphatics: No lymphadenopathy neck,   Musculoskeletal: Normal range of motion, Normal strength.  Integumentary: No rash.  Neurologic: Alert, Oriented, No focal deficits, Cranial Nerves II-XII are grossly intact.  Psychiatric: Appropriate mood & affect.    =======================================================  Antibiotics Course:  remdesivir  IVPB   500 mL/Hr (07-24-21 @ 10:54)   500 mL/Hr (07-25-21 @ 10:17)      Vitals:  ============  T(F): 98.3 (25 Jul 2021 07:30), Max: 98.3 (24 Jul 2021 17:23)  HR: 83 (25 Jul 2021 07:30)  BP: 124/80 (25 Jul 2021 07:30)  RR: 19 (25 Jul 2021 11:21)  SpO2: 92% (25 Jul 2021 11:21) (90% - 97%)  temp max in last 48H T(F): , Max: 99.1 (07-24-21 @ 00:47)    =======================================================  Current Antibiotics:  remdesivir  IVPB 100 milliGRAM(s) IV Intermittent every 24 hours  remdesivir  IVPB   IV Intermittent     Other medications:  ascorbic acid 500 milliGRAM(s) Oral daily  atorvastatin 20 milliGRAM(s) Oral at bedtime  cholecalciferol 2000 Unit(s) Oral daily  dexAMETHasone     Tablet 6 milliGRAM(s) Oral daily  enoxaparin Injectable 40 milliGRAM(s) SubCutaneous every 12 hours  pantoprazole    Tablet 40 milliGRAM(s) Oral before breakfast  venlafaxine XR. 37.5 milliGRAM(s) Oral daily  zinc sulfate 220 milliGRAM(s) Oral daily      =======================================================  Labs:                        14.5   8.80  )-----------( 208      ( 25 Jul 2021 07:52 )             44.7     07-25    138  |  102  |  26.4<H>  ----------------------------<  129<H>  4.8   |  23.0  |  1.10    Ca    9.7      25 Jul 2021 07:52  Phos  4.5     07-25  Mg     2.4     07-25    TPro  7.4  /  Alb  3.7  /  TBili  0.4  /  DBili  0.1  /  AST  55<H>  /  ALT  54<H>  /  AlkPhos  85  07-25        C-Reactive Protein, Serum: 27 mg/L (07-24-21 @ 05:48)      Procalcitonin, Serum: 0.13 ng/mL (07-24-21 @ 05:48)    SARS-CoV-2: Detected (07-24-21 @ 02:13)  Rapid RVP Result: Detected (07-24-21 @ 02:13)            
[facility] Division of Hospital Medicine    Chief Complaint:    SOB    SUBJECTIVE / OVERNIGHT EVENTS:  Improvement in fatigue. Slept well. Pleuritic chest pain has improved.     Patient denies abd pain, N/V, fever, chills, dysuria or any other complaints. All remainder ROS negative.     MEDICATIONS  (STANDING):  ascorbic acid 500 milliGRAM(s) Oral daily  atorvastatin 20 milliGRAM(s) Oral at bedtime  cholecalciferol 2000 Unit(s) Oral daily  dexAMETHasone     Tablet 6 milliGRAM(s) Oral daily  enoxaparin Injectable 40 milliGRAM(s) SubCutaneous every 12 hours  pantoprazole    Tablet 40 milliGRAM(s) Oral before breakfast  remdesivir  IVPB 100 milliGRAM(s) IV Intermittent every 24 hours  remdesivir  IVPB   IV Intermittent   venlafaxine XR. 37.5 milliGRAM(s) Oral daily  zinc sulfate 220 milliGRAM(s) Oral daily    MEDICATIONS  (PRN):  acetaminophen   Tablet .. 650 milliGRAM(s) Oral every 4 hours PRN Temp greater or equal to 38.5C (101.3F)  acetaminophen  Suppository .. 650 milliGRAM(s) Rectal every 4 hours PRN Temp greater or equal to 38.5C (101.3F)  ALBUTerol    90 MICROgram(s) HFA Inhaler 2 Puff(s) Inhalation every 4 hours PRN Shortness of Breath and/or Wheezing  benzonatate 100 milliGRAM(s) Oral three times a day PRN Cough  guaifenesin/dextromethorphan Oral Liquid 10 milliLiter(s) Oral every 4 hours PRN Cough  melatonin 3 milliGRAM(s) Oral at bedtime PRN Insomnia        I&O's Summary    25 Jul 2021 07:01  -  25 Jul 2021 16:36  --------------------------------------------------------  IN: 0 mL / OUT: 600 mL / NET: -600 mL        PHYSICAL EXAM:  Vital Signs Last 24 Hrs  T(C): 36.7 (25 Jul 2021 16:24), Max: 36.8 (24 Jul 2021 17:23)  T(F): 98 (25 Jul 2021 16:24), Max: 98.3 (24 Jul 2021 17:23)  HR: 64 (25 Jul 2021 16:24) (64 - 83)  BP: 123/80 (25 Jul 2021 16:24) (119/76 - 135/67)  BP(mean): --  RR: 16 (25 Jul 2021 16:24) (16 - 19)  SpO2: 94% (25 Jul 2021 16:24) (90% - 97%)        CONSTITUTIONAL: NAD, obese, well-developed, well-groomed  ENMT: Moist oral mucosa, no pharyngeal injection or exudates; normal dentition  RESPIRATORY: Increased respiratory effort; lungs are clear to auscultation bilaterally  CARDIOVASCULAR: Regular rate and rhythm, normal S1 and S2, no murmur/rub/gallop; No lower extremity edema; Peripheral pulses are 2+ bilaterally  ABDOMEN: Nontender to palpation, normoactive bowel sounds, no rebound/guarding; No hepatosplenomegaly  MUSCLOSKELETAL:  Normal gait; no clubbing or cyanosis of digits; no joint swelling or tenderness to palpation  PSYCH: A+O to person, place, and time; affect appropriate  NEUROLOGY: CN 2-12 are intact and symmetric; no gross sensory deficits;   SKIN: No rashes; no palpable lesions    LABS:                        14.5   8.80  )-----------( 208      ( 25 Jul 2021 07:52 )             44.7     07-25    138  |  102  |  26.4<H>  ----------------------------<  129<H>  4.8   |  23.0  |  1.10    Ca    9.7      25 Jul 2021 07:52  Phos  4.5     07-25  Mg     2.4     07-25    TPro  7.4  /  Alb  3.7  /  TBili  0.4  /  DBili  0.1  /  AST  55<H>  /  ALT  54<H>  /  AlkPhos  85  07-25    PT/INR - ( 25 Jul 2021 07:52 )   PT: 12.6 sec;   INR: 1.09 ratio           CARDIAC MARKERS ( 24 Jul 2021 02:14 )  x     / <0.01 ng/mL / x     / x     / x              CAPILLARY BLOOD GLUCOSE            RADIOLOGY & ADDITIONAL TESTS:  Results Reviewed:   Imaging Personally Reviewed:  Electrocardiogram Personally Reviewed:

## 2021-07-25 NOTE — PROGRESS NOTE ADULT - ASSESSMENT
46M with history of anxiety and depression  and HLD presenting with symptoms of general malaise, insomnia, cough, SOB from home after testing COVID + 7/21 (Family is + but asymptomatic) dound to have Hypoxic Resp failure on arrival with moderate increase in inflamm markers c/w COVID PNA    #covid pna ccb hypoxic resp failure   C/w Dex and Remdesivir  ID following   Given patients body habitus is high risk for developing ARDS. Plan to observe closely overnight and consider discharge tomorrow.  Will trend inflamm markers and LFTs  Incentive spirometry, Encourage Self Prone    DVT PPX Lovenox BID  Dispo Patients status remains acute and requires close monitoring and continuous pulse ox as well as IV antivirals. Consider discharge tomorrow.

## 2021-07-25 NOTE — PROGRESS NOTE ADULT - ASSESSMENT
47 yo male, pmh of anxiety/depression, hld who presents 7/23/21 to ED for worsening cough, head congestion and dyspnea, worse on exertion. Says that he tested positive for covid 2 days prior on wednesday. Says that his entire family (Wife and son) also positive for covid at home. Symptoms started several days prior on saturday, described as flu like symptoms with body aches, chills, fever, cough, dyspnea, fatigue. Since then, has noted increased difficulty breathing and worsening cough, unable to produce sputum. Says that he did NOT obtain covid vaccine despite having qualified earlier. Also associated with diarrhea but not abdominal pain, n/v. No other numbness/weakness in body. Denies headache, blurry vision, chest pain, palpitations, dysuria, hematuria, leg swelling.   In ED: given decadron 10, duonebs x1, admission called for hypoxia.  (24 Jul 2021 03:28)    COVID testing is confirmed positive here.  Xray of the lungs show: bilateral infiltrates, PRELIM.   He is unvaccinated.   13 yr old SON and his WIFE are both sick  he is overweight.   height 5'10"  weight 295lb      Impression:  COVID-19 infection   Viral pneumonia  shortness of breath  lung infiltrates  dependence on oxygen    Plan:    Appears to be clinically improving.   - continue Remdesivir IV daily.  will plan for a 5 day course.   today is day #2 of 5    - continue dexamethasone 6mg daily, While on remdesivir  trend Inflammatory markers and LFTs   - trend CBC with diff, CMP  daily    - Continue supportive care measures  - continue Oxygenation as needed;  CONTINUE to titrate down as tolerated  - self- proning  as tolerated  - ENCOURAGED OOB to chair  - encouraged incentive spirometry       DEFER  Actemra/Tocilizumab         patient is off oxygen for about 1 day.   suggest to watch overnight    If patient continues to improve, can consider discharge to community on 7/26/21  he should finish a 10 day course of Dexamethasone;  IV--> PO on discharge.         No further specific infectious disease recommendations. Will be available as needed.    Thank you for allowing me to participate in the care of your patient.   Feel free to call me back for any new concerns.

## 2021-07-26 VITALS
SYSTOLIC BLOOD PRESSURE: 122 MMHG | HEART RATE: 67 BPM | OXYGEN SATURATION: 94 % | DIASTOLIC BLOOD PRESSURE: 63 MMHG | RESPIRATION RATE: 18 BRPM | TEMPERATURE: 98 F

## 2021-07-26 LAB
4/8 RATIO: 2.88 RATIO — SIGNIFICANT CHANGE UP (ref 0.9–3.6)
ABS CD8: 78 /UL — LOW (ref 142–740)
ALBUMIN SERPL ELPH-MCNC: 3.6 G/DL — SIGNIFICANT CHANGE UP (ref 3.3–5.2)
ALP SERPL-CCNC: 88 U/L — SIGNIFICANT CHANGE UP (ref 40–120)
ALT FLD-CCNC: 53 U/L — HIGH
ANION GAP SERPL CALC-SCNC: 12 MMOL/L — SIGNIFICANT CHANGE UP (ref 5–17)
AST SERPL-CCNC: 39 U/L — SIGNIFICANT CHANGE UP
BASOPHILS # BLD AUTO: 0.01 K/UL — SIGNIFICANT CHANGE UP (ref 0–0.2)
BASOPHILS NFR BLD AUTO: 0.1 % — SIGNIFICANT CHANGE UP (ref 0–2)
BILIRUB DIRECT SERPL-MCNC: 0.1 MG/DL — SIGNIFICANT CHANGE UP (ref 0–0.3)
BILIRUB INDIRECT FLD-MCNC: 0.3 MG/DL — SIGNIFICANT CHANGE UP (ref 0.2–1)
BILIRUB SERPL-MCNC: 0.4 MG/DL — SIGNIFICANT CHANGE UP (ref 0.4–2)
BUN SERPL-MCNC: 25.6 MG/DL — HIGH (ref 8–20)
CALCIUM SERPL-MCNC: 9.2 MG/DL — SIGNIFICANT CHANGE UP (ref 8.6–10.2)
CD3 BLASTS SPEC-ACNC: 332 /UL — LOW (ref 672–1870)
CD3 BLASTS SPEC-ACNC: 51 % — LOW (ref 59–83)
CD4 %: 34 % — SIGNIFICANT CHANGE UP (ref 30–62)
CD8 %: 12 % — SIGNIFICANT CHANGE UP (ref 12–36)
CHLORIDE SERPL-SCNC: 104 MMOL/L — SIGNIFICANT CHANGE UP (ref 98–107)
CO2 SERPL-SCNC: 21 MMOL/L — LOW (ref 22–29)
CREAT SERPL-MCNC: 0.83 MG/DL — SIGNIFICANT CHANGE UP (ref 0.5–1.3)
CRP SERPL-MCNC: 7 MG/L — HIGH
D DIMER BLD IA.RAPID-MCNC: <150 NG/ML DDU — SIGNIFICANT CHANGE UP
EOSINOPHIL # BLD AUTO: 0 K/UL — SIGNIFICANT CHANGE UP (ref 0–0.5)
EOSINOPHIL NFR BLD AUTO: 0 % — SIGNIFICANT CHANGE UP (ref 0–6)
GLUCOSE SERPL-MCNC: 124 MG/DL — HIGH (ref 70–99)
HCT VFR BLD CALC: 42.5 % — SIGNIFICANT CHANGE UP (ref 39–50)
HGB BLD-MCNC: 14.2 G/DL — SIGNIFICANT CHANGE UP (ref 13–17)
IMM GRANULOCYTES NFR BLD AUTO: 0.7 % — SIGNIFICANT CHANGE UP (ref 0–1.5)
INR BLD: 1.13 RATIO — SIGNIFICANT CHANGE UP (ref 0.88–1.16)
LYMPHOCYTES # BLD AUTO: 1.24 K/UL — SIGNIFICANT CHANGE UP (ref 1–3.3)
LYMPHOCYTES # BLD AUTO: 11.5 % — LOW (ref 13–44)
MCHC RBC-ENTMCNC: 27.1 PG — SIGNIFICANT CHANGE UP (ref 27–34)
MCHC RBC-ENTMCNC: 33.4 GM/DL — SIGNIFICANT CHANGE UP (ref 32–36)
MCV RBC AUTO: 81.1 FL — SIGNIFICANT CHANGE UP (ref 80–100)
MONOCYTES # BLD AUTO: 0.92 K/UL — HIGH (ref 0–0.9)
MONOCYTES NFR BLD AUTO: 8.6 % — SIGNIFICANT CHANGE UP (ref 2–14)
NEUTROPHILS # BLD AUTO: 8.51 K/UL — HIGH (ref 1.8–7.4)
NEUTROPHILS NFR BLD AUTO: 79.1 % — HIGH (ref 43–77)
PLATELET # BLD AUTO: 284 K/UL — SIGNIFICANT CHANGE UP (ref 150–400)
POTASSIUM SERPL-MCNC: 4 MMOL/L — SIGNIFICANT CHANGE UP (ref 3.5–5.3)
POTASSIUM SERPL-SCNC: 4 MMOL/L — SIGNIFICANT CHANGE UP (ref 3.5–5.3)
PROT SERPL-MCNC: 7.3 G/DL — SIGNIFICANT CHANGE UP (ref 6.6–8.7)
PROTHROM AB SERPL-ACNC: 13 SEC — SIGNIFICANT CHANGE UP (ref 10.6–13.6)
RBC # BLD: 5.24 M/UL — SIGNIFICANT CHANGE UP (ref 4.2–5.8)
RBC # FLD: 14 % — SIGNIFICANT CHANGE UP (ref 10.3–14.5)
SODIUM SERPL-SCNC: 137 MMOL/L — SIGNIFICANT CHANGE UP (ref 135–145)
T-CELL CD4 SUBSET PNL BLD: 224 /UL — LOW (ref 489–1457)
WBC # BLD: 10.75 K/UL — HIGH (ref 3.8–10.5)
WBC # FLD AUTO: 10.75 K/UL — HIGH (ref 3.8–10.5)

## 2021-07-26 PROCEDURE — 99239 HOSP IP/OBS DSCHRG MGMT >30: CPT

## 2021-07-26 PROCEDURE — 83615 LACTATE (LD) (LDH) ENZYME: CPT

## 2021-07-26 PROCEDURE — 86360 T CELL ABSOLUTE COUNT/RATIO: CPT

## 2021-07-26 PROCEDURE — 80053 COMPREHEN METABOLIC PANEL: CPT

## 2021-07-26 PROCEDURE — 85610 PROTHROMBIN TIME: CPT

## 2021-07-26 PROCEDURE — 86140 C-REACTIVE PROTEIN: CPT

## 2021-07-26 PROCEDURE — 84145 PROCALCITONIN (PCT): CPT

## 2021-07-26 PROCEDURE — 84100 ASSAY OF PHOSPHORUS: CPT

## 2021-07-26 PROCEDURE — 96374 THER/PROPH/DIAG INJ IV PUSH: CPT

## 2021-07-26 PROCEDURE — 80076 HEPATIC FUNCTION PANEL: CPT

## 2021-07-26 PROCEDURE — 80048 BASIC METABOLIC PNL TOTAL CA: CPT

## 2021-07-26 PROCEDURE — 83735 ASSAY OF MAGNESIUM: CPT

## 2021-07-26 PROCEDURE — 36415 COLL VENOUS BLD VENIPUNCTURE: CPT

## 2021-07-26 PROCEDURE — 86359 T CELLS TOTAL COUNT: CPT

## 2021-07-26 PROCEDURE — 83036 HEMOGLOBIN GLYCOSYLATED A1C: CPT

## 2021-07-26 PROCEDURE — 0225U NFCT DS DNA&RNA 21 SARSCOV2: CPT

## 2021-07-26 PROCEDURE — 86769 SARS-COV-2 COVID-19 ANTIBODY: CPT

## 2021-07-26 PROCEDURE — 82565 ASSAY OF CREATININE: CPT

## 2021-07-26 PROCEDURE — 85025 COMPLETE CBC W/AUTO DIFF WBC: CPT

## 2021-07-26 PROCEDURE — 96375 TX/PRO/DX INJ NEW DRUG ADDON: CPT

## 2021-07-26 PROCEDURE — 93005 ELECTROCARDIOGRAM TRACING: CPT

## 2021-07-26 PROCEDURE — 84484 ASSAY OF TROPONIN QUANT: CPT

## 2021-07-26 PROCEDURE — 99285 EMERGENCY DEPT VISIT HI MDM: CPT | Mod: 25

## 2021-07-26 PROCEDURE — 82728 ASSAY OF FERRITIN: CPT

## 2021-07-26 PROCEDURE — 71045 X-RAY EXAM CHEST 1 VIEW: CPT

## 2021-07-26 PROCEDURE — 85379 FIBRIN DEGRADATION QUANT: CPT

## 2021-07-26 PROCEDURE — 94640 AIRWAY INHALATION TREATMENT: CPT

## 2021-07-26 RX ORDER — DEXAMETHASONE 0.5 MG/5ML
1 ELIXIR ORAL
Qty: 6 | Refills: 0
Start: 2021-07-26 | End: 2021-07-31

## 2021-07-26 RX ADMIN — ZINC SULFATE TAB 220 MG (50 MG ZINC EQUIVALENT) 220 MILLIGRAM(S): 220 (50 ZN) TAB at 07:52

## 2021-07-26 RX ADMIN — Medication 10 MILLILITER(S): at 07:52

## 2021-07-26 RX ADMIN — Medication 500 MILLIGRAM(S): at 07:52

## 2021-07-26 RX ADMIN — Medication 6 MILLIGRAM(S): at 05:40

## 2021-07-26 RX ADMIN — Medication 37.5 MILLIGRAM(S): at 07:51

## 2021-07-26 RX ADMIN — PANTOPRAZOLE SODIUM 40 MILLIGRAM(S): 20 TABLET, DELAYED RELEASE ORAL at 07:51

## 2021-07-26 RX ADMIN — ENOXAPARIN SODIUM 40 MILLIGRAM(S): 100 INJECTION SUBCUTANEOUS at 05:40

## 2021-07-26 RX ADMIN — Medication 2000 UNIT(S): at 07:52

## 2021-07-26 RX ADMIN — REMDESIVIR 500 MILLIGRAM(S): 5 INJECTION INTRAVENOUS at 11:26

## 2021-07-26 NOTE — DISCHARGE NOTE PROVIDER - CARE PROVIDER_API CALL
Kaveh Bacon  INFECTIOUS DISEASE  [address] [place] [address]  ,  [facility]  Phone: [phone]  Fax: [phone]  Follow Up Time:

## 2021-07-26 NOTE — DISCHARGE NOTE PROVIDER - NSDCMRMEDTOKEN_GEN_ALL_CORE_FT
ATORVASTATIN 20 MG TABLET: TAKE 1 TABLET BY MOUTH EVERY DAY  benzonatate 100 mg oral capsule: 1 cap(s) orally 3 times a day, As needed, Cough  dexamethasone 6 mg oral tablet: 1 tab(s) orally once a day   VENLAFAXINE HCL ER 37.5 MG CAP: TAKE 1 CAPSULE BY MOUTH EVERY DAY

## 2021-07-26 NOTE — DISCHARGE NOTE PROVIDER - NSDCCPCAREPLAN_GEN_ALL_CORE_FT
PRINCIPAL DISCHARGE DIAGNOSIS  Diagnosis: Coronavirus infection  Assessment and Plan of Treatment: Please take dexamethason as prescribed for an additional 6 days. If develope CP, Significant SOB, or debilitating symtpoms please return to the ED.      SECONDARY DISCHARGE DIAGNOSES  Diagnosis: Hypoxemia  Assessment and Plan of Treatment: Hypoxemia is low oxygen level. You initially required oxygen in the ER however, your requirement improved.    Diagnosis: Class 3 severe obesity with body mass index (BMI) of 40.0 to 44.9 in adult, unspecified obesity type, unspecified whether serious comorbidity present  Assessment and Plan of Treatment: Please make efforts toward weight reduction to improve your risk for developing complications and comorbidities.

## 2021-07-26 NOTE — DISCHARGE NOTE NURSING/CASE MANAGEMENT/SOCIAL WORK - PATIENT PORTAL LINK FT
You can access the FollowMyHealth Patient Portal offered by [facility] by registering at the following website: [email or web address]. By joining [employer]’s FollowMyHealth portal, you will also be able to view your health information using other applications (apps) compatible with our system.

## 2021-07-26 NOTE — DISCHARGE NOTE PROVIDER - HOSPITAL COURSE
46M with history of anxiety and depression  and HLD presenting with symptoms of general malaise, insomnia, cough, SOB from home after testing COVID + 7/21 (Family is + but asymptomatic) found to have Hypoxic Resp failure on arrival with moderate increase in inflamm markers c/w COVID PNA. Oxygen requirement improved on D2 admission. Given patient's BMI and Comorbidities opted to monitor closely off RA x 2 days. ID consulted and followed during admission. Received 4 days of Remdesivir, Dex, and ppx dose lovenox. Inflamm markers downtrending and symptoms improved. Patient was ambulated and saturating >92. Medically stable for discharge with plan for total 10 days steroids.    Vital Signs Last 24 Hrs  T(C): 36.7 (26 Jul 2021 07:53), Max: 36.7 (25 Jul 2021 16:24)  T(F): 98.1 (26 Jul 2021 07:53), Max: 98.1 (26 Jul 2021 07:53)  HR: 73 (26 Jul 2021 07:53) (63 - 73)  BP: 102/64 (26 Jul 2021 07:53) (102/64 - 130/76)  BP(mean): 78 (26 Jul 2021 07:53) (78 - 78)  RR: 18 (26 Jul 2021 07:53) (16 - 19)  SpO2: 92% (26 Jul 2021 07:53) (90% - 94%)    CONSTITUTIONAL: NAD, obese, well-developed, well-groomed  ENMT: Moist oral mucosa, no pharyngeal injection or exudates; normal dentition  RESPIRATORY: Normal respiratory effort; lungs are clear to auscultation bilaterally  CARDIOVASCULAR: Regular rate and rhythm, normal S1 and S2, no murmur/rub/gallop; No lower extremity edema; Peripheral pulses are 2+ bilaterally  ABDOMEN: Nontender to palpation, normoactive bowel sounds, no rebound/guarding; No hepatosplenomegaly  MUSCLOSKELETAL:  Normal gait; no clubbing or cyanosis of digits; no joint swelling or tenderness to palpation  PSYCH: A+O to person, place, and time; affect appropriate  NEUROLOGY: CN 2-12 are intact and symmetric; no gross sensory deficits;   SKIN: No rashes; no palpable lesions    Discharge planning required greater than 	33 minutes